# Patient Record
Sex: MALE | Race: WHITE | NOT HISPANIC OR LATINO | Employment: STUDENT | URBAN - METROPOLITAN AREA
[De-identification: names, ages, dates, MRNs, and addresses within clinical notes are randomized per-mention and may not be internally consistent; named-entity substitution may affect disease eponyms.]

---

## 2023-01-12 ENCOUNTER — OFFICE VISIT (OUTPATIENT)
Dept: FAMILY MEDICINE | Facility: CLINIC | Age: 33
End: 2023-01-12
Payer: COMMERCIAL

## 2023-01-12 VITALS
HEIGHT: 71 IN | HEART RATE: 81 BPM | RESPIRATION RATE: 16 BRPM | BODY MASS INDEX: 38.64 KG/M2 | TEMPERATURE: 96.8 F | SYSTOLIC BLOOD PRESSURE: 134 MMHG | DIASTOLIC BLOOD PRESSURE: 80 MMHG | WEIGHT: 276 LBS | OXYGEN SATURATION: 99 %
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
## 2023-01-12 DIAGNOSIS — L03.011 CELLULITIS OF FINGER, RIGHT: ICD-10-CM

## 2023-01-12 PROCEDURE — 90471 IMMUNIZATION ADMIN: CPT | Performed by: PHYSICIAN ASSISTANT

## 2023-01-12 PROCEDURE — 90715 TDAP VACCINE 7 YRS/> IM: CPT | Performed by: PHYSICIAN ASSISTANT

## 2023-01-12 PROCEDURE — 99203 OFFICE O/P NEW LOW 30 MIN: CPT | Mod: 25 | Performed by: PHYSICIAN ASSISTANT

## 2023-01-12 ASSESSMENT — PAIN SCALES - GENERAL: PAINLEVEL: SEVERE PAIN (6)

## 2023-01-12 NOTE — PROGRESS NOTES
"  Assessment & Plan       ICD-10-CM    1. Dog bite, initial encounter  W54.0XXA amoxicillin-clavulanate (AUGMENTIN) 875-125 MG tablet      2. Cellulitis of finger, right  L03.011 amoxicillin-clavulanate (AUGMENTIN) 875-125 MG tablet      Talk to patient about his finger.  At this point his tetanus is updated we will get him started on Augmentin.  Warning signs and side effects were discussed.  If his symptoms worsen in the next day or 2 he should go to the emergency room for further evaluation otherwise he should follow-up with his primary provider next week for reexamination.    Return in about 1 week (around 1/19/2023).    DAVID Gage [facility]    Dunia Pool is a 32 year old accompanied by his self, presenting for the following health issues:  Finger      History of Present Illness       Reason for visit:  Finger  Symptom onset:  3-7 days ago    He eats 2-3 servings of fruits and vegetables daily.He consumes 0 sweetened beverage(s) daily.He exercises with enough effort to increase his heart rate 20 to 29 minutes per day.  He exercises with enough effort to increase his heart rate 3 or less days per week.   He is taking medications regularly.  States about 6 days ago he was playing with his dog and his dog nipped his right long finger.  States his dog is vaccinated.  Gradually pain and swelling has increased and now he has stiffness.  No fevers.  He denies any numbness or tingling.  Last tetanus shot was 8 years ago.  He is right-hand dominant.  He has pain with range of motion.    Review of Systems   Constitutional, HEENT, cardiovascular, pulmonary, gi and gu systems are negative, except as otherwise noted.      Objective    /80   Pulse 81   Temp 96.8  F (36  C) (Tympanic)   Resp 16   Ht 1.81 m (5' 11.25\")   Wt 125.2 kg (276 lb)   SpO2 99%   BMI 38.22 kg/m    Body mass index is 38.22 kg/m .  Physical Exam   GENERAL: healthy, alert and no distress  Right " long finger he has a small healing puncture wound on the radial aspect of his proximal phalanx with diffuse erythema and swelling.  Minimal pain with passive range of motion of the IP joint.  He is neuro vas intact distally.

## 2023-01-20 ENCOUNTER — NURSE TRIAGE (OUTPATIENT)
Dept: NURSING | Facility: CLINIC | Age: 33
End: 2023-01-20

## 2023-01-20 NOTE — TELEPHONE ENCOUNTER
"Dog bite one week ago caused an infection  to right middle finger, was seen in office and has 2 days of antibiotics left. States wound is healed and rates pain a \"2.\" Denies swelling and is able to open and bend finger, but unable to make a fist. Care advice is home care. Yrn agreed to call back is becomes worse.  Shira Tran RN on 1/20/2023 at 4:04 PM        Reason for Disposition    Minor finger injury    Additional Information    Negative: Major bleeding (actively dripping or spurting) that can't be stopped    Negative: Sounds like a life-threatening emergency to the triager    Negative: Wound looks infected    Negative: Caused by animal bite    Negative: Amputation    Negative: High-pressure injection injury (e.g., from paint gun, usually work-related)    Negative: Looks like a broken bone or dislocated joint (e.g., crooked or deformed)    Negative: Skin is split open or gaping (length > 1/2 inch or 12 mm)    Negative: Cut or scrape is very deep (e.g., can see bone or tendons)    Negative: Bleeding won't stop after 10 minutes of direct pressure (using correct technique)    Negative: Dirt in the wound and not removed after 15 minutes of scrubbing    Negative: Cut with numbness (loss of sensation) of finger    Negative: Fingernail is partially torn from a crush injury  (Exception: Torn nail from catching it on something.)    Negative: Sounds like a serious injury to the triager    Negative: Looks infected (e.g., spreading redness, red streak, pus)    Negative: Fingernail is completely torn off    Negative: Base of fingernail has popped out from under skin fold (cuticle)    Negative: SEVERE pain (e.g., excruciating)    Negative: MODERATE-SEVERE pain and blood present under the nail (usually > 50% of nail bed)    Negative: Finger joint can't be opened (straightened) or closed (bent) completely    Negative: No prior tetanus shots (or is not fully vaccinated) and any wound (e.g., cut or scrape)    Negative: HIV " positive or severe immunodeficiency (severely weak immune system) and DIRTY cut or scrape    Negative: Patient wants to be seen    Negative: Injury interferes with work or school    Negative: Last tetanus shot > 5 years ago and DIRTY cut or scrape    Negative: Last tetanus shot > 10 years ago and CLEAN cut or scrape    Negative: Suspicious history for the injury    Negative: Injury and pain has not improved after 3 days    Negative: Injury is still painful or swollen after 2 weeks    Protocols used: FINGER INJURY-A-[place]

## 2023-07-10 ENCOUNTER — ANCILLARY ORDERS (OUTPATIENT)
Dept: CT IMAGING | Facility: CLINIC | Age: 33
End: 2023-07-10

## 2023-07-10 DIAGNOSIS — R10.32 LEFT LOWER QUADRANT ABDOMINAL PAIN: ICD-10-CM

## 2023-07-28 ENCOUNTER — HOSPITAL ENCOUNTER (OUTPATIENT)
Dept: CT IMAGING | Facility: CLINIC | Age: 33
Discharge: HOME | End: 2023-07-28
Admitting: PHYSICIAN ASSISTANT
Payer: COMMERCIAL

## 2023-07-28 DIAGNOSIS — R10.32 LEFT LOWER QUADRANT ABDOMINAL PAIN: ICD-10-CM

## 2023-07-28 PROCEDURE — 250N000009 HC RX 250

## 2023-07-28 PROCEDURE — 74177 CT ABD & PELVIS W/CONTRAST: CPT

## 2023-07-28 PROCEDURE — 250N000011 HC RX IP 250 OP 636

## 2023-07-28 RX ORDER — IOPAMIDOL 755 MG/ML
100 INJECTION, SOLUTION INTRAVASCULAR ONCE
Status: COMPLETED | OUTPATIENT
Start: 2023-07-28 | End: 2023-07-28

## 2023-07-28 RX ADMIN — IOPAMIDOL 100 ML: 755 INJECTION, SOLUTION INTRAVENOUS at 07:59

## 2023-07-28 RX ADMIN — SODIUM CHLORIDE 74 ML: 9 INJECTION, SOLUTION INTRAVENOUS at 07:59

## 2023-12-21 ENCOUNTER — OFFICE VISIT (OUTPATIENT)
Dept: URGENT CARE | Facility: URGENT CARE | Age: 33
End: 2023-12-21
Payer: COMMERCIAL

## 2023-12-21 ENCOUNTER — ANCILLARY PROCEDURE (OUTPATIENT)
Dept: GENERAL RADIOLOGY | Facility: CLINIC | Age: 33
End: 2023-12-21
Attending: PHYSICIAN ASSISTANT
Payer: COMMERCIAL

## 2023-12-21 VITALS
OXYGEN SATURATION: 98 % | TEMPERATURE: 98.5 F | WEIGHT: 256.8 LBS | DIASTOLIC BLOOD PRESSURE: 87 MMHG | HEART RATE: 74 BPM | SYSTOLIC BLOOD PRESSURE: 133 MMHG | RESPIRATION RATE: 20 BRPM | BODY MASS INDEX: 35.57 KG/M2

## 2023-12-21 DIAGNOSIS — R06.2 WHEEZING: ICD-10-CM

## 2023-12-21 DIAGNOSIS — J01.90 ACUTE SINUSITIS WITH SYMPTOMS > 10 DAYS: Primary | ICD-10-CM

## 2023-12-21 DIAGNOSIS — J06.9 VIRAL URI WITH COUGH: ICD-10-CM

## 2023-12-21 DIAGNOSIS — R05.1 ACUTE COUGH: ICD-10-CM

## 2023-12-21 PROCEDURE — 71046 X-RAY EXAM CHEST 2 VIEWS: CPT | Mod: TC | Performed by: RADIOLOGY

## 2023-12-21 PROCEDURE — 99214 OFFICE O/P EST MOD 30 MIN: CPT | Performed by: PHYSICIAN ASSISTANT

## 2023-12-21 RX ORDER — ALBUTEROL SULFATE 90 UG/1
2 AEROSOL, METERED RESPIRATORY (INHALATION) EVERY 4 HOURS PRN
Qty: 18 G | Refills: 0 | Status: SHIPPED | OUTPATIENT
Start: 2023-12-21

## 2023-12-21 RX ORDER — BENZONATATE 100 MG/1
CAPSULE ORAL
Qty: 30 CAPSULE | Refills: 0 | Status: SHIPPED | OUTPATIENT
Start: 2023-12-21

## 2023-12-21 NOTE — PATIENT INSTRUCTIONS
Augmentin (amoxicillin-clavulanate) 875mg twice daily for 7 days as directed.  Flonase (fluticasone) 2 sprays in each nostril daily until symptoms resolve, then continue 1 spray in each nostril for at least 5 more days.  Take Tylenol or an NSAID such as ibuprofen or naproxen as needed for pain.  May use netti pot with bottled or distilled water and saline packets to flush sinuses.      Rest! Your body needs more rest to heal.  Drink plenty of fluids (warm fluids like tea or soup are soothing and reduce cough)  Sit in the bathroom with a hot shower running and breathe in the steam.  Honey may soothe your sore throat and help manage your cough- may take straight or in warm water with lemon juice.  Avoid smoke (cigarettes, bonfires, fireplace, wood burning stoves).  Take Tylenol or an NSAID such as ibuprofen or naproxen as needed for pain.  Delsym (dextromethorphan polistirex) is an over the counter cough medication that lasts 12 hours.   Mucinex or Robitussin (guiafenesin) thin mucus and may help it to loosen more quickly  Good handwashing is the best way to prevent spread of germs  Present to emergency room if you develop trouble breathing, swallowing or cough-up blood.  Follow up with your primary care provider if symptoms worsen or fail to improve as expected.

## 2023-12-21 NOTE — PROGRESS NOTES
Assessment & Plan     Acute sinusitis with symptoms > 10 days  - amoxicillin-clavulanate (AUGMENTIN) 875-125 MG tablet; Take 1 tablet by mouth 2 times daily for 7 days    Viral URI with cough  - albuterol (PROAIR HFA/PROVENTIL HFA/VENTOLIN HFA) 108 (90 Base) MCG/ACT inhaler; Inhale 2 puffs into the lungs every 4 hours as needed for shortness of breath or wheezing  - benzonatate (TESSALON) 100 MG capsule; Take 1-2 capsules by mouth up to 3 times daily as needed for cough.    Acute cough  - XR Chest 2 Views; Future  - benzonatate (TESSALON) 100 MG capsule; Take 1-2 capsules by mouth up to 3 times daily as needed for cough.    Wheezing    Chest x-ray without infiltrate or signs of pneumonia by my read, awaiting read by radiology.  We discussed treating sinusitis with Augmentin as symptoms have been worsening after 10 days.  Upper respiratory symptoms viral, continue Tylenol, ibuprofen, honey lemon tea, fluids and rest.  Albuterol as needed for tightness in chest.  Follow-up to be seen if symptoms significantly worsen or fail to improve.    Return in about 1 week (around 12/28/2023) for visit with primary care provider if not improving.     DAVID Calderón [facility]    Subjective   Yrn Paredes is a 33 year old who presents for the following health issues     Patient presents with:  URI: Congestion, sinus pressure, runny nose, cough, losing voice, night sweats, frequent urination- has had sx for over a week. Neg home covid tests       HPI    Yrn presents to clinic today for evaluation of a cough.  Symptoms first began 10 days ago.  He had cough and nasal congestion and then developed a sore throat. He has a headache, pressure in frontal sinuses and puruent nasal discharge. His cough is productive of yellow sputum.  He is coughing from a tickle in his throat but also from deeper in his chest, his right lung feels full.  He has not had a recorded fever but has had some night  sweats.  He also notes some urinary frequency without dysuria but states that he has been drinking extra fluids to help clear this infection.      Review of Systems   ROS negative except as stated above.      Objective    /87   Pulse 74   Temp 98.5  F (36.9  C) (Tympanic)   Resp 20   Wt 116.5 kg (256 lb 12.8 oz)   SpO2 98%   BMI 35.57 kg/m    Physical Exam   GENERAL: healthy, alert and no distress  EYES: Eyes grossly normal to inspection, PERRL and conjunctivae and sclerae normal  HENT: ear canals and TM's normal, nose with erythematous and edematous turbinates bilaterally, right sided purulent discharge and mouth without ulcers or lesions  NECK: no adenopathy, no asymmetry, masses, or scars and thyroid normal to palpation  RESP: Expiratory wheezing throughout lung fields and right lung only, left lung clear, no rales, rhonchi, no increased respiratory effort  CV: regular rate and rhythm, normal S1 S2, no S3 or S4, no murmur, click or rub, no peripheral edema and peripheral pulses strong    Results for orders placed or performed in visit on 12/21/23   XR Chest 2 Views     Status: None    Narrative    CHEST TWO VIEWS  12/21/2023 12:59 PM     HISTORY: Cough for 10 days, no fever, right lung expiratory wheezing,  left lung clear; rule out pneumonia. Acute cough.    COMPARISON: None.       Impression    IMPRESSION:  There are no acute infiltrates. The cardiac silhouette is  not enlarged. Pulmonary vasculature is unremarkable.     BEULAH CAPONE MD         SYSTEM ID:  [id number]

## 2023-12-27 ENCOUNTER — OFFICE VISIT (OUTPATIENT)
Dept: FAMILY MEDICINE | Facility: CLINIC | Age: 33
End: 2023-12-27
Payer: COMMERCIAL

## 2023-12-27 VITALS
HEIGHT: 71 IN | TEMPERATURE: 98.2 F | HEART RATE: 82 BPM | OXYGEN SATURATION: 95 % | SYSTOLIC BLOOD PRESSURE: 116 MMHG | RESPIRATION RATE: 16 BRPM | BODY MASS INDEX: 36.54 KG/M2 | WEIGHT: 261 LBS | DIASTOLIC BLOOD PRESSURE: 81 MMHG

## 2023-12-27 DIAGNOSIS — R06.2 WHEEZING: ICD-10-CM

## 2023-12-27 DIAGNOSIS — R05.2 SUBACUTE COUGH: Primary | ICD-10-CM

## 2023-12-27 PROCEDURE — 99214 OFFICE O/P EST MOD 30 MIN: CPT

## 2023-12-27 RX ORDER — METHYLPREDNISOLONE 4 MG
TABLET, DOSE PACK ORAL
Qty: 21 TABLET | Refills: 0 | Status: SHIPPED | OUTPATIENT
Start: 2023-12-27

## 2023-12-27 ASSESSMENT — PAIN SCALES - GENERAL: PAINLEVEL: MILD PAIN (2)

## 2023-12-27 NOTE — PROGRESS NOTES
"  Assessment & Plan     Subacute cough  -Can continue with Mucinex DM and Tessalon pearls for symptom management  -Can continue to use albuterol inhaler as needed  - methylPREDNISolone (MEDROL DOSEPAK) 4 MG tablet therapy pack  Dispense: 21 tablet; Refill: 0  -Advised to start steroids in the morning  -Had chest x-ray done last week, no infiltrates noted      Wheezing  -Can continue albuterol inhaler as needed  -Follow-up with PCP if symptoms do not resolve once postinfectious cough resolved. Has remote history of asthma as a child but has never needed an inhaler      Review of external notes as documented elsewhere in note  30 minutes spent by me on the date of the encounter doing chart review, history and exam, documentation and further activities per the note    BMI:   Estimated body mass index is 36.4 kg/m  as calculated from the following:    Height as of this encounter: 1.803 m (5' 11\").    Weight as of this encounter: 118.4 kg (261 lb).   Weight management plan: Discussed healthy diet and exercise guidelines    See Patient Instructions    Angeles Osborn DNP  [facility]    Dunia Pool is a 33 year old, presenting for the following health issues:  UC Follow-Up, Cough, URI, and Wheezing      Went into UC last Thursday for cough lasting >10 days after some URI symptoms. He had some nasal congestion and sinus pressure and was prescribed azithromycin for a sinus infection. Took the last antibiotic today and for the most part, the sinus infection symptoms have resolved. Still having the cough that persists and some occasional wheezing. Sometimes will wake up sweating and warm but no objective fevers. Will occasionally cough up yellow phlegm, sometimes as much as quarter sized. No blood. More SOB than usual with working out. Cough is somewhat persistent. Will wake at night with cough.   Feels about 50% better, cough has become less frequent. Notices more inspiratory wheezing now. No " "CP/palpitations, N/V/D. Some increased urinary frequency but thinks this may be due to increasing fluids to help with infection.    Finished the course of antibiotics and used the albuterol inhaler 2-3x/day. Tessalon pearls didn't seem to help much. Has been taking Mucinex and a Dayquil every day to help manage the symptoms. Also does Flonase for the nasal congestion which mostly resolved.    Thinks he had asthma in his childhood but has not needed an inhaler.  No other history or family history of lung disease.  No complaints of reflux.  No personal history of diabetes.     With the nasal congestion which is mostly resolved      ED/UC Followup:    Facility:  [facility]/ [place]  Date of visit: 12/21/23  Reason for visit: URI  Current Status: wheezing, coughing(productive)        Review of Systems   Constitutional, HEENT, cardiovascular, pulmonary, gi and gu systems are negative, except as otherwise noted.      Objective    /81 (BP Location: Right arm, Patient Position: Sitting, Cuff Size: Adult Large)   Pulse 82   Temp 98.2  F (36.8  C) (Oral)   Resp 16   Ht 1.803 m (5' 11\")   Wt 118.4 kg (261 lb)   SpO2 95%   BMI 36.40 kg/m    Body mass index is 36.4 kg/m .    Physical Exam  Vitals reviewed.   Constitutional:       Appearance: Normal appearance.   HENT:      Head: Normocephalic.      Right Ear: Tympanic membrane, ear canal and external ear normal.      Left Ear: Ear canal and external ear normal. Tympanic membrane is injected.      Mouth/Throat:      Mouth: Mucous membranes are dry.      Pharynx: Posterior oropharyngeal erythema present.   Eyes:      Pupils: Pupils are equal, round, and reactive to light.   Cardiovascular:      Rate and Rhythm: Normal rate and regular rhythm.      Pulses: Normal pulses.      Heart sounds: Normal heart sounds.   Pulmonary:      Effort: Pulmonary effort is normal.      Breath sounds: Normal air entry. Examination of the right-upper field reveals wheezing. " Examination of the left-upper field reveals wheezing. Examination of the right-middle field reveals wheezing. Examination of the left-middle field reveals wheezing. Examination of the right-lower field reveals wheezing. Examination of the left-lower field reveals wheezing. Wheezing present.   Abdominal:      General: Bowel sounds are normal.      Palpations: Abdomen is soft.   Musculoskeletal:         General: Normal range of motion.      Cervical back: Normal range of motion and neck supple.   Skin:     General: Skin is warm and dry.   Neurological:      Mental Status: He is alert and oriented to person, place, and time.   Psychiatric:         Attention and Perception: Attention normal.         Mood and Affect: Mood normal.         Behavior: Behavior normal.         Cognition and Memory: Cognition normal.         Judgment: Judgment normal.

## 2024-01-15 ENCOUNTER — TRANSCRIBE ORDERS (OUTPATIENT)
Dept: OTHER | Age: 34
End: 2024-01-15

## 2024-01-15 DIAGNOSIS — N50.812 TESTICULAR PAIN, LEFT: Primary | ICD-10-CM

## 2024-01-19 ENCOUNTER — OFFICE VISIT (OUTPATIENT)
Dept: UROLOGY | Facility: CLINIC | Age: 34
End: 2024-01-19
Payer: COMMERCIAL

## 2024-01-19 VITALS
WEIGHT: 259 LBS | HEART RATE: 59 BPM | DIASTOLIC BLOOD PRESSURE: 78 MMHG | HEIGHT: 71 IN | SYSTOLIC BLOOD PRESSURE: 135 MMHG | BODY MASS INDEX: 36.26 KG/M2 | OXYGEN SATURATION: 99 %

## 2024-01-19 DIAGNOSIS — N50.812 TESTICULAR PAIN, LEFT: ICD-10-CM

## 2024-01-19 LAB
ALBUMIN UR-MCNC: NEGATIVE MG/DL
APPEARANCE UR: CLEAR
BILIRUB UR QL STRIP: NEGATIVE
COLOR UR AUTO: YELLOW
GLUCOSE UR STRIP-MCNC: NEGATIVE MG/DL
HGB UR QL STRIP: NEGATIVE
KETONES UR STRIP-MCNC: NEGATIVE MG/DL
LEUKOCYTE ESTERASE UR QL STRIP: NEGATIVE
NITRATE UR QL: NEGATIVE
PH UR STRIP: 6.5 [PH] (ref 5–7)
RESIDUAL VOLUME (RV) (EXTERNAL): 23
SP GR UR STRIP: 1.02 (ref 1–1.03)
UROBILINOGEN UR STRIP-ACNC: 0.2 E.U./DL

## 2024-01-19 PROCEDURE — 99203 OFFICE O/P NEW LOW 30 MIN: CPT | Mod: 25 | Performed by: PHYSICIAN ASSISTANT

## 2024-01-19 PROCEDURE — 81003 URINALYSIS AUTO W/O SCOPE: CPT | Mod: QW | Performed by: PHYSICIAN ASSISTANT

## 2024-01-19 PROCEDURE — 51798 US URINE CAPACITY MEASURE: CPT | Performed by: PHYSICIAN ASSISTANT

## 2024-01-19 NOTE — PROGRESS NOTES
CC: scrotal changes    HPI:  Yrn Paredes is a 33 year old male who presents in consultation from Zack Elliott PA-C for evaluation of the above.  Noted to have acute left-sided scrotal pain in Oct that awakened him in the middle of the night. Scrotal US noted a  subcentimeter simple cyst within the left epididymal head. Pain has resolved, although now he has a sense of tingling/numbness in the left scrotum. No urinary symptoms. No penile or ejaculatory pain. Thinks the left is riding higher than it used to.       Clenches his teeth. Working on weight loss and has been using stationary bike several times a week. Can have tight hip flexors.     Past Medical History:   Diagnosis Date    History of asthma     childhood, no inhaler in many years       No past surgical history on file.    Social History     Socioeconomic History    Marital status: Single     Spouse name: Not on file    Number of children: Not on file    Years of education: Not on file    Highest education level: Not on file   Occupational History    Not on file   Tobacco Use    Smoking status: Never    Smokeless tobacco: Never   Substance and Sexual Activity    Alcohol use: Not on file    Drug use: Not on file    Sexual activity: Not on file   Other Topics Concern    Not on file   Social History Narrative    Not on file     Social Determinants of Health     Financial Resource Strain: Low Risk  (12/27/2023)    Financial Resource Strain     Within the past 12 months, have you or your family members you live with been unable to get utilities (heat, electricity) when it was really needed?: No   Food Insecurity: Low Risk  (12/27/2023)    Food Insecurity     Within the past 12 months, did you worry that your food would run out before you got money to buy more?: No     Within the past 12 months, did the food you bought just not last and you didn t have money to get more?: No   Transportation Needs: Low Risk  (12/27/2023)    Transportation Needs      Within the past 12 months, has lack of transportation kept you from medical appointments, getting your medicines, non-medical meetings or appointments, work, or from getting things that you need?: No   Physical Activity: Not on file   Stress: Not on file   Social Connections: Not on file   Interpersonal Safety: Low Risk  (12/27/2023)    Interpersonal Safety     Do you feel physically and emotionally safe where you currently live?: Yes     Within the past 12 months, have you been hit, slapped, kicked or otherwise physically hurt by someone?: No     Within the past 12 months, have you been humiliated or emotionally abused in other ways by your partner or ex-partner?: No   Housing Stability: Low Risk  (12/27/2023)    Housing Stability     Do you have housing? : Yes     Are you worried about losing your housing?: No       Family History   Problem Relation Age of Onset    Lung Cancer Paternal Grandmother        Allergies   Allergen Reactions    Dust Mites        Current Outpatient Medications   Medication    albuterol (PROAIR HFA/PROVENTIL HFA/VENTOLIN HFA) 108 (90 Base) MCG/ACT inhaler    benzonatate (TESSALON) 100 MG capsule    Ferrous Sulfate (IRON PO)    methylPREDNISolone (MEDROL DOSEPAK) 4 MG tablet therapy pack     No current facility-administered medications for this visit.         PEx:   There were no vitals taken for this visit.    PSYCH: NAD  EYES: EOMI  MOUTH: MMM  NEURO: AAO x3  :      no penile plaques or lesions.      Orthotopic location of the urethral meatus.     Scrotum normal.     Testicles of normal firmness and consistency, no masses.     Epididymes bilaterally without masses, no tenderness with palpation.     Vas and cord normal bilaterally.    PROCEDURE: SCROTUM AND TESTICULAR ULTRASOUND     CLINICAL INDICATION:Left testicular pain        COMPARISON: None     FINDINGS:   Symmetric color flow within both testicles on color Doppler   images. Right testicle measures 5.2 x 2.5 x 3.5 cm.  Left    testicle measures 4.9 x 2.7 x 3.1 cm.        Well-circumscribed cystic lesion within the head of the left   epididymis measures up to 8 x 5 x 7 mm in size.  Visualized   portions of the right epididymis are unremarkable.     No appreciable hydroceles or varicoceles at time of this   exam.     IMPRESSION:   1. Unremarkable testicular ultrasound.  No evidence of   torsion at time of this exam.   2. Subcentimeter simple cyst within the left epididymal head   may represent a benign cyst or spermatocele.  No specific   follow-up is required.         A/P: Yrn Paredes is a 33 year old male with left-scrotal sensations/hx of pain  -PFPT eval  -US and exam reviewed with him and very reassuring.   -follow-up KALEY Iqbal PA-C  [facility]        30 minutes spent on the date of the encounter doing chart review, review of outside records, review of test results, interpretation of tests, patient visit and documentation

## 2024-01-19 NOTE — NURSING NOTE
Chief Complaint   Patient presents with    Testicular/scrotal Pain     Patient state he has pain on left side         Patient state he had Pain on his left side  Patient he feel like he is emptying his bladder    UA RESULTS:  Recent Labs   Lab Test 01/19/24  1303   COLOR Yellow   APPEARANCE Clear   URINEGLC Negative   URINEBILI Negative   URINEKETONE Negative   SG 1.025   UBLD Negative   URINEPH 6.5   PROTEIN Negative   UROBILINOGEN 0.2   NITRITE Negative   LEUKEST Negative             Neha Fortune [facility]

## 2024-01-19 NOTE — PATIENT INSTRUCTIONS
Please see one of the dedicated pelvic floor physical therapists ([facility] [phone]).    Please be aware that coverage of these services is subject to the terms and limitations of your health insurance plan.  Call member services at your health plan with any benefit or coverage questions.      Please bring the following to your appointment:    *Your personal calendar for scheduling future appointments  *Comfortable clothing    Please return to see Dr. Kapadia in 3 months if not improving.  Call [phone] to make appointment if you feel necessary.

## 2024-01-19 NOTE — LETTER
1/19/2024       RE: Yrn Paredes  [address] [facility] [address]     Dear Colleague,    Thank you for referring your patient, Yrn Paredes, to the [facility] at [facility]. Please see a copy of my visit note below.    CC: scrotal changes    HPI:  Yrn Paredes is a 33 year old male who presents in consultation from Zack Elliott PA-C for evaluation of the above.  Noted to have acute left-sided scrotal pain in Oct that awakened him in the middle of the night. Scrotal US noted a  subcentimeter simple cyst within the left epididymal head. Pain has resolved, although now he has a sense of tingling/numbness in the left scrotum. No urinary symptoms. No penile or ejaculatory pain. Thinks the left is riding higher than it used to.       Clenches his teeth. Working on weight loss and has been using stationary bike several times a week. Can have tight hip flexors.     Past Medical History:   Diagnosis Date    History of asthma     childhood, no inhaler in many years       No past surgical history on file.    Social History     Socioeconomic History    Marital status: Single     Spouse name: Not on file    Number of children: Not on file    Years of education: Not on file    Highest education level: Not on file   Occupational History    Not on file   Tobacco Use    Smoking status: Never    Smokeless tobacco: Never   Substance and Sexual Activity    Alcohol use: Not on file    Drug use: Not on file    Sexual activity: Not on file   Other Topics Concern    Not on file   Social History Narrative    Not on file     Social Determinants of Health     Financial Resource Strain: Low Risk  (12/27/2023)    Financial Resource Strain     Within the past 12 months, have you or your family members you live with been unable to get utilities (heat, electricity) when it was really needed?: No   Food Insecurity: Low Risk  (12/27/2023)    Food  Insecurity     Within the past 12 months, did you worry that your food would run out before you got money to buy more?: No     Within the past 12 months, did the food you bought just not last and you didn t have money to get more?: No   Transportation Needs: Low Risk  (12/27/2023)    Transportation Needs     Within the past 12 months, has lack of transportation kept you from medical appointments, getting your medicines, non-medical meetings or appointments, work, or from getting things that you need?: No   Physical Activity: Not on file   Stress: Not on file   Social Connections: Not on file   Interpersonal Safety: Low Risk  (12/27/2023)    Interpersonal Safety     Do you feel physically and emotionally safe where you currently live?: Yes     Within the past 12 months, have you been hit, slapped, kicked or otherwise physically hurt by someone?: No     Within the past 12 months, have you been humiliated or emotionally abused in other ways by your partner or ex-partner?: No   Housing Stability: Low Risk  (12/27/2023)    Housing Stability     Do you have housing? : Yes     Are you worried about losing your housing?: No       Family History   Problem Relation Age of Onset    Lung Cancer Paternal Grandmother        Allergies   Allergen Reactions    Dust Mites        Current Outpatient Medications   Medication    albuterol (PROAIR HFA/PROVENTIL HFA/VENTOLIN HFA) 108 (90 Base) MCG/ACT inhaler    benzonatate (TESSALON) 100 MG capsule    Ferrous Sulfate (IRON PO)    methylPREDNISolone (MEDROL DOSEPAK) 4 MG tablet therapy pack     No current facility-administered medications for this visit.         PEx:   There were no vitals taken for this visit.    PSYCH: NAD  EYES: EOMI  MOUTH: MMM  NEURO: AAO x3  :      no penile plaques or lesions.      Orthotopic location of the urethral meatus.     Scrotum normal.     Testicles of normal firmness and consistency, no masses.     Epididymes bilaterally without masses, no tenderness  with palpation.     Vas and cord normal bilaterally.    PROCEDURE: SCROTUM AND TESTICULAR ULTRASOUND     CLINICAL INDICATION:Left testicular pain        COMPARISON: None     FINDINGS:   Symmetric color flow within both testicles on color Doppler   images. Right testicle measures 5.2 x 2.5 x 3.5 cm.  Left   testicle measures 4.9 x 2.7 x 3.1 cm.        Well-circumscribed cystic lesion within the head of the left   epididymis measures up to 8 x 5 x 7 mm in size.  Visualized   portions of the right epididymis are unremarkable.     No appreciable hydroceles or varicoceles at time of this   exam.     IMPRESSION:   1. Unremarkable testicular ultrasound.  No evidence of   torsion at time of this exam.   2. Subcentimeter simple cyst within the left epididymal head   may represent a benign cyst or spermatocele.  No specific   follow-up is required.         A/P: Yrn aPredes is a 33 year old male with left-scrotal sensations/hx of pain  -PFPT eval  -US and exam reviewed with him and very reassuring.   -follow-up KALEY Iqbal PA-C  [facility]        30 minutes spent on the date of the encounter doing chart review, review of outside records, review of test results, interpretation of tests, patient visit and documentation

## 2024-02-01 ENCOUNTER — THERAPY VISIT (OUTPATIENT)
Dept: PHYSICAL THERAPY | Facility: CLINIC | Age: 34
End: 2024-02-01
Payer: COMMERCIAL

## 2024-02-01 DIAGNOSIS — N50.812 TESTICULAR PAIN, LEFT: ICD-10-CM

## 2024-02-01 DIAGNOSIS — R10.2 PELVIC PAIN IN MALE: Primary | ICD-10-CM

## 2024-02-01 DIAGNOSIS — N39.8 VOIDING DYSFUNCTION: ICD-10-CM

## 2024-02-01 PROCEDURE — 97161 PT EVAL LOW COMPLEX 20 MIN: CPT | Mod: GP | Performed by: PHYSICAL THERAPIST

## 2024-02-01 PROCEDURE — 97110 THERAPEUTIC EXERCISES: CPT | Mod: GP | Performed by: PHYSICAL THERAPIST

## 2024-02-01 PROCEDURE — 97530 THERAPEUTIC ACTIVITIES: CPT | Mod: GP | Performed by: PHYSICAL THERAPIST

## 2024-02-01 NOTE — PROGRESS NOTES
PHYSICAL THERAPY EVALUATION  Type of Visit: Evaluation    See electronic medical record for Abuse and Falls Screening details.    Subjective       Presenting condition or subjective complaint: Patient reports onset of L testicle pain in 7/2023 while sleeping- went away immediately. He noted in 9/2023 the the testicle was resting/sitting higher. MD palpated a nodule in testicle in 10/23- ultrasound was normal. He then noted a  feeling of numbness in the L testicle/scrotum intermittently on a daily basis. His urologist advised PT for possible pelvic floor issues. Currently, reports improvement however feels this random numbness in testicle is about 1x weekly. Pt denies any changes in bowel or bladder habits. He reports a slow urine and post void dribbling in his underwear daily and this has been occurring for 2 years. He also notes perineal numbness when sitting on his stationary bike for less than 5 min.  History of chronic LBP for 10+ years - no change or increased issues noted currently.   Date of onset: 01/19/24 (MD order date for PT)    Relevant medical history:     Dates & types of surgery:      Prior diagnostic imaging/testing results: -- (ultrasound, post void residual) both normal   Prior therapy history for the same diagnosis, illness or injury: No      Prior Level of Function  Transfers:   Ambulation:   ADL:   IADL:     Living Environment  Social support: With a significant other or spouse   Type of home: House   Stairs to enter the home: Yes 16 Is there a railing: Yes   Ramp: No   Stairs inside the home: Yes 16 Is there a railing: Yes   Help at home: None  Equipment owned:       Employment: Yes lead manager  Hobbies/Interests: peletron    Patient goals for therapy:      Pain assessment: none     Objective      PELVIC EVALUATION  ADDITIONAL HISTORY:  Sex assigned at birth: Male  Gender identity: Male    Pronouns: He/Him/His      Bladder History:  Feels bladder filling: Yes  Triggers for feeling of inability  to wait to go to the bathroom: No    How long can you wait to urinate:    Gets up at night to urinate: Yes 1  Can stop the flow of urine when urinating: Yes  Volume of urine usually released: Medium   Other issues: Slow or hesitant urine stream; Dribbling after urinating  Number of bladder infections in last 12 months:    Fluid intake per day: 64 12 oz    Medications taken for bladder: No     Activities causing urine leak:      Amount of urine typically leaked:    Pads used to help with leaking:          Bowel History:  Frequency of bowel movement: 1-2x a day  Consistency of stool: Soft-formed    Ignores the urge to defecate: No  Other bowel issues:    Length of time spent trying to have a bowel movement:      Sexual Function History:  Sexual orientation: Straight    Sexually active: Yes  Lubrication used: No No  Pelvic pain:      Pain or difficulty with orgasms/erection/ejaculation: No    State of menopause:    Hormone medications: No      Are you currently pregnant: No, Do you get regular exercise: Yes, I do this type of exercise: Biking, Have you tried pelvic floor strengthening exercises for 4 weeks: No, Do you have any history of trauma that is relevant to your care that you d like to share: No    Discussed reason for referral regarding pelvic health needs and external/internal pelvic floor muscle examination with patient/guardian.  Opportunity provided to ask questions and verbal consent for assessment and intervention was given.    PAIN: none  POSTURE: Sitting Posture: slouched  LUMBAR SCREEN: AROM WNL FLX, Ext B SB  HIP SCREEN: B A-PROM WFL  Flexibility: B hamstring tightness  Strength:    Functional Strength Testing:     PELVIC/SI SCREEN:     PAIN PROVOCATION TEST:   PELVIS/SI SPECIAL TESTS:   BREATHING SYMMETRY:     PELVIC EXAM  External Visual Inspection:  At rest: Normal  With voluntary pelvic floor contraction: Present  Relaxation of PFM: Partial/delayed relaxation  Endurance: 8-10 sec hold prior to  fatigue    Integumentary:       External Digital Palpation per Perineum:   Transverse perineal: Tightness, Tenderness  Levator ani: Tightness, Tenderness L    Scar:   Location/Type:   Mobility:     Internal Digital Palpation:  Per Vagina:      Per Rectum:        Pelvic Organ Prolapse:       ABDOMINAL ASSESSMENT  Diastasis Rectus Abdominis (DAVID):      Abdominal Activation/Strength:     Scar:   Location/Type:   Mobility:     Fascial Tension/Restriction:     BIOFEEDBACK:  Position:   Surface Electrodes:     Abdominals:     Perianals:       DERMATOMES:   DTR S:     Assessment & Plan   CLINICAL IMPRESSIONS  Medical Diagnosis: testicular pain, left    Treatment Diagnosis: L testicular pain, pelvic pain, voiding dysfunction   Impression/Assessment: Patient is a 33 year old male with L testicular pain complaints.  The following significant findings have been identified: Decreased ROM/flexibility, Impaired muscle performance, and numbness L testicle . These impairments interfere with their ability to perform self care tasks and recreational activities as compared to previous level of function.     Clinical Decision Making (Complexity):  Clinical Presentation: Stable/Uncomplicated  Clinical Presentation Rationale: based on medical and personal factors listed in PT evaluation  Clinical Decision Making (Complexity): Low complexity    PLAN OF CARE  Treatment Interventions:  Interventions: Manual Therapy, Neuromuscular Re-education, Therapeutic Activity, Therapeutic Exercise, Self-Care/Home Management    Long Term Goals     PT Goal 1  Goal Identifier: pelvic/ L testicular pain  Goal Description: abolish L testicular/perineal numbness with ADL's, mild numbness at 20' or longer on stationary bike (baseline: L testicle numbness 1-2x week( lasts 10-15 sec.) perineal numbness sitting on stationary bike within 2-3 min.)  Rationale: to maximize safety and independence with performance of ADLs and functional tasks  Target Date:  04/11/24  PT Goal 2  Goal Identifier: voiding dysfunction  Goal Description: Pt will urinate without hesitation or post void dribbling 80% of voids (baseline: hesitation and post void dribbling daily with 75% of voids)  Rationale: to maximize safety and independence with self cares  Target Date: 04/11/24      Frequency of Treatment: 1x week every other week for  Duration of Treatment: 10 weeks    Recommended Referrals to Other Professionals: Physical Therapy  Education Assessment:   Learner/Method: Patient;Listening;Pictures/Video    Risks and benefits of evaluation/treatment have been explained.   Patient/Family/caregiver agrees with Plan of Care.     Evaluation Time:     PT Eval, Low Complexity Minutes (88744): 25       Signing Clinician: Shahnaz Taylor PT

## 2024-04-15 PROBLEM — R10.2 PELVIC PAIN IN MALE: Status: RESOLVED | Noted: 2024-02-01 | Resolved: 2024-04-15

## 2024-04-15 PROBLEM — N50.812 TESTICULAR PAIN, LEFT: Status: RESOLVED | Noted: 2024-02-01 | Resolved: 2024-04-15

## 2024-04-15 PROBLEM — N39.8 VOIDING DYSFUNCTION: Status: RESOLVED | Noted: 2024-02-01 | Resolved: 2024-04-15

## 2025-01-05 ENCOUNTER — HEALTH MAINTENANCE LETTER (OUTPATIENT)
Age: 35
End: 2025-01-05